# Patient Record
Sex: MALE | Race: WHITE | NOT HISPANIC OR LATINO | Employment: OTHER | URBAN - METROPOLITAN AREA
[De-identification: names, ages, dates, MRNs, and addresses within clinical notes are randomized per-mention and may not be internally consistent; named-entity substitution may affect disease eponyms.]

---

## 2020-05-27 ENCOUNTER — TELEPHONE (OUTPATIENT)
Dept: INTERNAL MEDICINE | Facility: CLINIC | Age: 85
End: 2020-05-27

## 2020-05-27 NOTE — TELEPHONE ENCOUNTER
Patient's son Neeraj Gambino and daughter in law Ene Gambino are patient's of Dr. Cuellar. They were told by Dr. Cuellar that he would accept Neeraj's parents (Soumya & Rob Gambino) as new patients.     Please call Neeraj Gambino back to advise and schedule. [phone]